# Patient Record
Sex: MALE | Race: WHITE
[De-identification: names, ages, dates, MRNs, and addresses within clinical notes are randomized per-mention and may not be internally consistent; named-entity substitution may affect disease eponyms.]

---

## 2017-08-24 ENCOUNTER — HOSPITAL ENCOUNTER (INPATIENT)
Dept: HOSPITAL 54 - ER | Age: 57
LOS: 3 days | Discharge: HOME | DRG: 177 | End: 2017-08-27
Payer: MEDICARE

## 2017-08-24 VITALS — HEIGHT: 67 IN | BODY MASS INDEX: 20.92 KG/M2 | WEIGHT: 133.31 LBS

## 2017-08-24 VITALS — DIASTOLIC BLOOD PRESSURE: 89 MMHG | SYSTOLIC BLOOD PRESSURE: 134 MMHG

## 2017-08-24 DIAGNOSIS — J15.9: ICD-10-CM

## 2017-08-24 DIAGNOSIS — J90: ICD-10-CM

## 2017-08-24 DIAGNOSIS — Z99.2: ICD-10-CM

## 2017-08-24 DIAGNOSIS — E87.5: ICD-10-CM

## 2017-08-24 DIAGNOSIS — N18.6: ICD-10-CM

## 2017-08-24 DIAGNOSIS — Z98.890: ICD-10-CM

## 2017-08-24 DIAGNOSIS — F17.200: ICD-10-CM

## 2017-08-24 DIAGNOSIS — I12.0: ICD-10-CM

## 2017-08-24 DIAGNOSIS — E11.22: ICD-10-CM

## 2017-08-24 DIAGNOSIS — J15.6: Primary | ICD-10-CM

## 2017-08-24 DIAGNOSIS — K74.60: ICD-10-CM

## 2017-08-24 DIAGNOSIS — Z87.01: ICD-10-CM

## 2017-08-24 DIAGNOSIS — E78.5: ICD-10-CM

## 2017-08-24 DIAGNOSIS — Z94.4: ICD-10-CM

## 2017-08-24 DIAGNOSIS — D89.9: ICD-10-CM

## 2017-08-24 LAB
ALBUMIN SERPL BCP-MCNC: 4.1 G/DL (ref 3.4–5)
ALP SERPL-CCNC: 211 U/L (ref 46–116)
ALT SERPL W P-5'-P-CCNC: 25 U/L (ref 12–78)
AST SERPL W P-5'-P-CCNC: 17 U/L (ref 15–37)
BASOPHILS # BLD AUTO: 0 /CMM (ref 0–0.2)
BASOPHILS NFR BLD AUTO: 0.3 % (ref 0–2)
BILIRUB DIRECT SERPL-MCNC: 0.3 MG/DL (ref 0–0.2)
BILIRUB SERPL-MCNC: 0.9 MG/DL (ref 0.2–1)
BUN SERPL-MCNC: 68 MG/DL (ref 7–18)
CALCIUM SERPL-MCNC: 8.8 MG/DL (ref 8.5–10.1)
CHLORIDE SERPL-SCNC: 100 MMOL/L (ref 98–107)
CO2 SERPL-SCNC: 24 MMOL/L (ref 21–32)
CREAT SERPL-MCNC: 9 MG/DL (ref 0.6–1.3)
EOSINOPHIL # BLD AUTO: 0.3 /CMM (ref 0–0.7)
EOSINOPHIL NFR BLD AUTO: 1.8 % (ref 0–6)
GLUCOSE SERPL-MCNC: 135 MG/DL (ref 74–106)
HCT VFR BLD AUTO: 39 % (ref 39–51)
HGB BLD-MCNC: 12.7 G/DL (ref 13.5–17.5)
LYMPHOCYTES NFR BLD AUTO: 0.9 /CMM (ref 0.8–4.8)
LYMPHOCYTES NFR BLD AUTO: 6.3 % (ref 20–44)
MCH RBC QN AUTO: 29 PG (ref 26–33)
MCHC RBC AUTO-ENTMCNC: 33 G/DL (ref 31–36)
MCV RBC AUTO: 88 FL (ref 80–96)
MONOCYTES NFR BLD AUTO: 0.9 /CMM (ref 0.1–1.3)
MONOCYTES NFR BLD AUTO: 6.1 % (ref 2–12)
NEUTROPHILS # BLD AUTO: 12 /CMM (ref 1.8–8.9)
NEUTROPHILS NFR BLD AUTO: 85.5 % (ref 43–81)
PLATELET # BLD AUTO: 192 /CMM (ref 150–450)
POTASSIUM SERPL-SCNC: 5.9 MMOL/L (ref 3.5–5.1)
PROT SERPL-MCNC: 8.9 G/DL (ref 6.4–8.2)
RBC # BLD AUTO: 4.44 MIL/UL (ref 4.5–6)
RDW COEFFICIENT OF VARIATION: 13.6 (ref 11.5–15)
SODIUM SERPL-SCNC: 138 MMOL/L (ref 136–145)
TROPONIN I SERPL-MCNC: 0.04 NG/ML (ref 0–0.06)
WBC NRBC COR # BLD AUTO: 14.1 K/UL (ref 4.3–11)

## 2017-08-24 PROCEDURE — A6402 STERILE GAUZE <= 16 SQ IN: HCPCS

## 2017-08-24 PROCEDURE — A4606 OXYGEN PROBE USED W OXIMETER: HCPCS

## 2017-08-24 PROCEDURE — Z7610: HCPCS

## 2017-08-24 NOTE — NUR
TELE RN ADMITTING NOTES

PATIENT ARRIVED ON UNIT VIA GURNEY TO -1 UNDER DR WOLF. REPORT RECEIVED FROM LENCHO JALLOH RN. PATIENT A/A/O X3, ABLE TO MAKE TO MAKE NEEDS KNOWN. BREATHING EVEN AND UNLABORED, ON 
ROOM AIR. DENIES SOB OR DIFFICULTY BREATHING. ON TELE W/ SINUS RHYTHM IN THE 90S. DENIES 
CHEST PAIN. RIGHT FORE ARM IV #20 INTACT AND PATENT, SALINE LOCK. NO COMPLICATIONS NOTED. 
SKIN WARM TO TOUCH, AFEBRILE. MILD NON-PITTING EDEMA NOTED ON BILATERAL FEET. DENIES ANY 
PAIN OR DISCOMFORT @ THIS TIME. SAFETY MEASURES IN PLACE W/ SIDE RAILS UP, BED LOCKED AND IN 
LOWEST POSITION, CALL LIGHT WITHIN REACH. WILL CONTINUE TO MONITOR.

## 2017-08-24 NOTE — NUR
[facility]

## 2017-08-24 NOTE — NUR
PATIENT PRESENT TO ER C/O COUGH, CONGESTION, AND FLU LIKE SYMPTOMS X 2 DAYS.  
PATIENT IS A/OX 4. BREATHING EVEN AND UNLABORED.  NO DISTRESS. VITALS STABLE.  
SAFETY AND COMFORT MEASURES IN  PLACE. AWAITING MD ORDERS.

## 2017-08-25 VITALS — DIASTOLIC BLOOD PRESSURE: 89 MMHG | SYSTOLIC BLOOD PRESSURE: 134 MMHG

## 2017-08-25 VITALS — SYSTOLIC BLOOD PRESSURE: 134 MMHG | DIASTOLIC BLOOD PRESSURE: 89 MMHG

## 2017-08-25 VITALS — DIASTOLIC BLOOD PRESSURE: 70 MMHG | SYSTOLIC BLOOD PRESSURE: 117 MMHG

## 2017-08-25 VITALS — SYSTOLIC BLOOD PRESSURE: 149 MMHG | DIASTOLIC BLOOD PRESSURE: 81 MMHG

## 2017-08-25 VITALS — SYSTOLIC BLOOD PRESSURE: 148 MMHG | DIASTOLIC BLOOD PRESSURE: 69 MMHG

## 2017-08-25 VITALS — SYSTOLIC BLOOD PRESSURE: 157 MMHG | DIASTOLIC BLOOD PRESSURE: 76 MMHG

## 2017-08-25 VITALS — DIASTOLIC BLOOD PRESSURE: 84 MMHG | SYSTOLIC BLOOD PRESSURE: 148 MMHG

## 2017-08-25 VITALS — SYSTOLIC BLOOD PRESSURE: 117 MMHG | DIASTOLIC BLOOD PRESSURE: 70 MMHG

## 2017-08-25 PROCEDURE — 5A1D00Z: ICD-10-PCS | Performed by: INTERNAL MEDICINE

## 2017-08-25 RX ADMIN — PIPERACILLIN SODIUM AND TAZOBACTAM SODIUM SCH MLS/HR: .25; 2 INJECTION, POWDER, LYOPHILIZED, FOR SOLUTION INTRAVENOUS at 17:12

## 2017-08-25 RX ADMIN — PIPERACILLIN SODIUM AND TAZOBACTAM SODIUM SCH MLS/HR: .25; 2 INJECTION, POWDER, LYOPHILIZED, FOR SOLUTION INTRAVENOUS at 21:19

## 2017-08-25 RX ADMIN — PANTOPRAZOLE SODIUM SCH MG: 40 TABLET, DELAYED RELEASE ORAL at 12:25

## 2017-08-25 RX ADMIN — TACROLIMUS SCH MG: 0.5 CAPSULE, GELATIN COATED ORAL at 17:12

## 2017-08-25 RX ADMIN — AMLODIPINE BESYLATE SCH MG: 2.5 TABLET ORAL at 21:17

## 2017-08-25 RX ADMIN — ZOLPIDEM TARTRATE PRN MG: 5 TABLET, FILM COATED ORAL at 22:29

## 2017-08-25 RX ADMIN — THERA TABS SCH UDTAB: TAB at 12:24

## 2017-08-25 RX ADMIN — VALSARTAN SCH MG: 80 TABLET ORAL at 17:11

## 2017-08-25 RX ADMIN — TACROLIMUS SCH MG: 0.5 CAPSULE, GELATIN COATED ORAL at 12:25

## 2017-08-25 NOTE — NUR
TELE RN CLOSING NOTES

PATIENT RESTING COMFORTABLY IN BED. NO ACUTE RESPIRATORY DISTRESS THROUGHOUT SHIFT, REMAINED 
ON ROOM AIR. DENIED ANY PAIN OR DISCOMFORT. NO SIGNIFICANT CHANGES DURING SHIFT. AMBULATED 
W/ STEADY GAIT. ALL DUE MEDS GIVEN W/ ONE TIME DOSE VANCO IV GIVEN. SAFETY MEASURES IN 
PLACE. WILL ENDORSE DESIRAE TO AM RN.

## 2017-08-25 NOTE — NUR
ms rn notes 

spoke to dr garcia re patient request for ambien,with order Ambien 5mg  via [facility] for 
insomnia, order noted and carried out.

## 2017-08-25 NOTE — NUR
TELE RN NOTE:

NO ACUTE CHANGES DURING SHIFT. PT A&OX3, DENIED PAIN, ON RA WITH RESPIRATIONS EVEN AND 
UNLABORED WITH NO SOB NOTED. PORFIRIO AV SHUNT. RFA #20 PATENT AND INTACT. BED LOW, LOCKED WITH 
CALL LIGHT WITHIN REACH. ORDERS CARRIED OUT. WILL ENDORSE TO NIGHT SHIFT NURSE FOR DESIRAE.

## 2017-08-25 NOTE — NUR
TELE RN NOTE:

RECEIVED PT RESTING COMFORTABLY IN BED WITH NO SIGN OF DISTRESS. PT IS ON RA, RESPIRATIONS 
EVEN AND UNLABORED. EASILY AWAKENED TO NAME, A&OX3, DENIES PAIN. ON TELE WITH SR 90S. PORFIRIO AV 
SHUNT. RFA SL #20 PATENT AND INTACT. MILD NON-PITTING EDEMA NOTED ON B/L FEET. BED LOW, 
LOCKED WITH CALL LIGHT WITHIN REACH. WILL CONT TO MONITOR.

## 2017-08-25 NOTE — NUR
ms rn notes 

pts on bed awake alert and responsive ,ambulatory , had  hd  today , 2.5 liters out .no sob 
no distress noted  v/s stable afebrile all needs attended too call light within reach  , all 
due meds given as ordered  kept pts clean dry and comfortable . pts is for thoracentisis   
us  guided jasmina,  consented for procedure.npo post mn  instructed , pts  made aware of  
procedure jasmina.

## 2017-08-25 NOTE — NUR
TELE RN NOTES

CALLED DR CASAREZ AND LEFT MESSAGE REGARDING NO CALL BACK FROM DR WOLF. AWAITING CALL BACK.

## 2017-08-26 VITALS — SYSTOLIC BLOOD PRESSURE: 155 MMHG | DIASTOLIC BLOOD PRESSURE: 83 MMHG

## 2017-08-26 VITALS — DIASTOLIC BLOOD PRESSURE: 73 MMHG | SYSTOLIC BLOOD PRESSURE: 122 MMHG

## 2017-08-26 VITALS — SYSTOLIC BLOOD PRESSURE: 146 MMHG | DIASTOLIC BLOOD PRESSURE: 72 MMHG

## 2017-08-26 LAB
APTT PPP: 32 SEC (ref 23–34)
INR PPP: 1.17 (ref 0.87–1.13)
PROTHROMBIN TIME: 12.7 SECS (ref 9.5–12.7)

## 2017-08-26 PROCEDURE — 0W993ZZ DRAINAGE OF RIGHT PLEURAL CAVITY, PERCUTANEOUS APPROACH: ICD-10-PCS

## 2017-08-26 RX ADMIN — PANTOPRAZOLE SODIUM SCH MG: 40 TABLET, DELAYED RELEASE ORAL at 11:10

## 2017-08-26 RX ADMIN — VALSARTAN SCH MG: 80 TABLET ORAL at 11:11

## 2017-08-26 RX ADMIN — PIPERACILLIN SODIUM AND TAZOBACTAM SODIUM SCH MLS/HR: .25; 2 INJECTION, POWDER, LYOPHILIZED, FOR SOLUTION INTRAVENOUS at 13:40

## 2017-08-26 RX ADMIN — TACROLIMUS SCH MG: 0.5 CAPSULE, GELATIN COATED ORAL at 11:11

## 2017-08-26 RX ADMIN — VALSARTAN SCH MG: 80 TABLET ORAL at 17:18

## 2017-08-26 RX ADMIN — AMLODIPINE BESYLATE SCH MG: 2.5 TABLET ORAL at 22:39

## 2017-08-26 RX ADMIN — THERA TABS SCH UDTAB: TAB at 11:10

## 2017-08-26 RX ADMIN — PIPERACILLIN SODIUM AND TAZOBACTAM SODIUM SCH MLS/HR: .25; 2 INJECTION, POWDER, LYOPHILIZED, FOR SOLUTION INTRAVENOUS at 21:27

## 2017-08-26 RX ADMIN — ZOLPIDEM TARTRATE PRN MG: 5 TABLET, FILM COATED ORAL at 22:39

## 2017-08-26 RX ADMIN — PIPERACILLIN SODIUM AND TAZOBACTAM SODIUM SCH MLS/HR: .25; 2 INJECTION, POWDER, LYOPHILIZED, FOR SOLUTION INTRAVENOUS at 04:43

## 2017-08-26 RX ADMIN — TACROLIMUS SCH MG: 0.5 CAPSULE, GELATIN COATED ORAL at 17:17

## 2017-08-26 NOTE — NUR
RN INITIAL NOTE



RN INITIAL NOTE



RECEIVED PT IN NO ACUTE DISTRESS IN BED. PT IS A/O X 3 AND ABLE TO MAKE NEEDS KNOWN. PT IS 
NOT C/O ANY SOB, DIFFICULTY BREATHING OR PAIN AT THIS TIME. PT HAS RFA 20G THAT IS CLEAN DRY 
INTACT AND PATENT WITH SALINE FLUSH. LINE IS SALINE LOCKED. PT HAS PORFIRIO AV SHUNT THAT IS 
CLEAN AND DRY.  BED IN LOW LOCK POSITION WITH RIALS UP X 2. CALL LIGHT WITHIN REACH AND ALL 
SAFETY MEASURES ENSURED AND CARRIED OUT. WILL CONTINUE TO MONITOR PT. PT IS REQUESTING TO BE 
D/C, BUT DOESNT WANT TO GO AMA. WILL CONTACT MD FOR ORDERS.

## 2017-08-26 NOTE — NUR
RN NOTE



SPOKE TO DR ALVARES ABOUT PT REQUESTING TO BE D/C. MD STATED HE WILL BE HERE IN THE 
MORNING TO ASSESS PT AND PUT IN DISCHARGE ORDERS. PT NOTIFIED.

## 2017-08-26 NOTE — NUR
TELE RN NOTE:

RECEIVED PT AWAKE IN BED, A&OX3, DENIES PAIN. ON RA, RESPIRATIONS EVEN AND UNLABORED. PORFIRIO AV 
SHUNT NOTED. RFA #20 PATENT AND INTACT. ALL NEEDS MET AND ANTICIPATED. BED LOW, LOCKED WITH 
CALL LIGHT WITHIN REACH. WILL CONT TO MONITOR.

## 2017-08-26 NOTE — NUR
TELE RN NOTE:

NO ACUTE CHANGES DURING SHIFT. PT A&OX3, DENIED PAIN, ON RA WITH RESPIRATIONS EVEN AND 
UNLABORED WITH NO SOB NOTED. PORFIRIO AV SHUNT. RFA #20 PATENT AND INTACT. BED LOW, LOCKED WITH 
CALL LIGHT WITHIN REACH. ORDERS CARRIED OUT. WILL ENDORSE TO NIGHT SHIFT NURSE FOR DESIARE.

## 2017-08-26 NOTE — NUR
MS RN NOTE:

PT POST THORACENTESIS FLUID REMOVED 1500ML. STAT XR 1 VIEW CHEST ORDERED. WILL HAVE TO 
REORDER XR 1 VIEW CHEST IN 1 HR PER RADIOLOGIST DR. GOODE.

## 2017-08-27 VITALS — DIASTOLIC BLOOD PRESSURE: 78 MMHG | SYSTOLIC BLOOD PRESSURE: 148 MMHG

## 2017-08-27 VITALS — DIASTOLIC BLOOD PRESSURE: 87 MMHG | SYSTOLIC BLOOD PRESSURE: 148 MMHG

## 2017-08-27 VITALS — SYSTOLIC BLOOD PRESSURE: 148 MMHG | DIASTOLIC BLOOD PRESSURE: 85 MMHG

## 2017-08-27 LAB
BUN SERPL-MCNC: 59 MG/DL (ref 7–18)
CALCIUM SERPL-MCNC: 8.1 MG/DL (ref 8.5–10.1)
CHLORIDE SERPL-SCNC: 97 MMOL/L (ref 98–107)
CO2 SERPL-SCNC: 28 MMOL/L (ref 21–32)
CREAT SERPL-MCNC: 9.8 MG/DL (ref 0.6–1.3)
GLUCOSE SERPL-MCNC: 155 MG/DL (ref 74–106)
POTASSIUM SERPL-SCNC: 3.6 MMOL/L (ref 3.5–5.1)
SODIUM SERPL-SCNC: 135 MMOL/L (ref 136–145)

## 2017-08-27 RX ADMIN — VALSARTAN SCH MG: 80 TABLET ORAL at 08:06

## 2017-08-27 RX ADMIN — PANTOPRAZOLE SODIUM SCH MG: 40 TABLET, DELAYED RELEASE ORAL at 08:06

## 2017-08-27 RX ADMIN — PIPERACILLIN SODIUM AND TAZOBACTAM SODIUM SCH MLS/HR: .25; 2 INJECTION, POWDER, LYOPHILIZED, FOR SOLUTION INTRAVENOUS at 05:10

## 2017-08-27 RX ADMIN — TACROLIMUS SCH MG: 0.5 CAPSULE, GELATIN COATED ORAL at 08:06

## 2017-08-27 RX ADMIN — THERA TABS SCH UDTAB: TAB at 08:05

## 2017-08-27 NOTE — NUR
MS RN NOTE

 DISCHARGE INSTRUCTION GIVEN ,UNDERSTOOD ,INSTRUCTED  HOW TO TAKE NEW PX AND POSSIBLE SIDE 
EFFECT ALSO INSTRUCTED TO FOLLOW WITH PRIMARY CARE DOCTOR AND HD SCHEDULE , OK TO GO HOME  
SELF , ABLE TO DRIVE CAR , HL REMOVED, DRY DRESSING APPLIED , BELONGING SIGNED ,WENT HOME 
WITH STABLE CONDITION

## 2017-08-27 NOTE — NUR
MS RN NOTE 





RECEIVED PT IN NO ACUTE DISTRESS IN BED. PT IS A/O X 3 AND ABLE TO MAKE NEEDS KNOWN. PT IS 
NOT C/O ANY SOB, DIFFICULTY BREATHING OR PAIN AT THIS TIME. PT HAS RFA 20G THAT IS CLEAN DRY 
INTACT AND PATENT WITH SALINE FLUSH. LINE IS SALINE LOCKED. PT HAS PORFIRIO AV SHUNT THAT IS 
CLEAN AND DRY.  BED IN LOW LOCK POSITION WITH RIALS UP X 2. CALL LIGHT WITHIN REACH AND ALL 
SAFETY MEASURES ENSURED AND CARRIED OUT. WILL CONTINUE TO MONITOR PT CLOSELY,PLANOF CARE 
DISCUSSED WITH PATIENT

## 2017-08-27 NOTE — NUR
RN CLOSING NOTE



PT REMAINS IN NO ACUTE DISTRESS IN BED. PT DID NOT HAVE ANY SIGNIFICANT CHANGE IN CONDITION 
DURING SHIFT. ALL NEEDS MET ALL ORDERS CARRIED OUT. WILL ENDORSE TO AM RN FOR CONTINUITY OF 
CARE. AWAITING ARRIVAL OF DR ALVARES.

## 2018-01-11 ENCOUNTER — HOSPITAL ENCOUNTER (INPATIENT)
Dept: HOSPITAL 54 - ER | Age: 58
LOS: 4 days | Discharge: LEFT BEFORE DISCHARGE | DRG: 291 | End: 2018-01-15
Attending: INTERNAL MEDICINE | Admitting: INTERNAL MEDICINE
Payer: MEDICARE

## 2018-01-11 VITALS — BODY MASS INDEX: 24.96 KG/M2 | HEIGHT: 67 IN | WEIGHT: 159 LBS

## 2018-01-11 DIAGNOSIS — I42.9: ICD-10-CM

## 2018-01-11 DIAGNOSIS — Z87.01: ICD-10-CM

## 2018-01-11 DIAGNOSIS — R18.8: ICD-10-CM

## 2018-01-11 DIAGNOSIS — N18.6: ICD-10-CM

## 2018-01-11 DIAGNOSIS — R93.5: ICD-10-CM

## 2018-01-11 DIAGNOSIS — Z87.891: ICD-10-CM

## 2018-01-11 DIAGNOSIS — Z99.2: ICD-10-CM

## 2018-01-11 DIAGNOSIS — E87.5: ICD-10-CM

## 2018-01-11 DIAGNOSIS — Z94.4: ICD-10-CM

## 2018-01-11 DIAGNOSIS — I13.2: Primary | ICD-10-CM

## 2018-01-11 DIAGNOSIS — J96.00: ICD-10-CM

## 2018-01-11 DIAGNOSIS — I27.20: ICD-10-CM

## 2018-01-11 DIAGNOSIS — J90: ICD-10-CM

## 2018-01-11 DIAGNOSIS — I50.33: ICD-10-CM

## 2018-01-11 DIAGNOSIS — I31.3: ICD-10-CM

## 2018-01-11 PROCEDURE — Z7610: HCPCS

## 2018-01-11 PROCEDURE — A4606 OXYGEN PROBE USED W OXIMETER: HCPCS

## 2018-01-11 NOTE — NUR
PT PRESENTED TO THE ER WITH A C/O SOB. PT STATED THAT HE GETS SOB WHEN LYING 
DOWN. PT IS AFRAID THAT HE MAY HAVE FLUID ON HIS LUNGS. PT AMBULATED TO BED #6 
WITH A STEADY GAIT. PT IS SPEAKING IN FULL SENTENCES. PT IS ON THE MONITOR AND 
CONTINUOUS PULSE OX.

## 2018-01-12 VITALS — DIASTOLIC BLOOD PRESSURE: 94 MMHG | SYSTOLIC BLOOD PRESSURE: 152 MMHG

## 2018-01-12 VITALS — DIASTOLIC BLOOD PRESSURE: 84 MMHG | SYSTOLIC BLOOD PRESSURE: 142 MMHG

## 2018-01-12 VITALS — DIASTOLIC BLOOD PRESSURE: 75 MMHG | SYSTOLIC BLOOD PRESSURE: 140 MMHG

## 2018-01-12 VITALS — DIASTOLIC BLOOD PRESSURE: 84 MMHG | SYSTOLIC BLOOD PRESSURE: 122 MMHG

## 2018-01-12 VITALS — SYSTOLIC BLOOD PRESSURE: 152 MMHG | DIASTOLIC BLOOD PRESSURE: 94 MMHG

## 2018-01-12 VITALS — DIASTOLIC BLOOD PRESSURE: 90 MMHG | SYSTOLIC BLOOD PRESSURE: 150 MMHG

## 2018-01-12 VITALS — DIASTOLIC BLOOD PRESSURE: 76 MMHG | SYSTOLIC BLOOD PRESSURE: 117 MMHG

## 2018-01-12 LAB
ALBUMIN SERPL BCP-MCNC: 3.6 G/DL (ref 3.4–5)
ALP SERPL-CCNC: 238 U/L (ref 46–116)
ALT SERPL W P-5'-P-CCNC: 15 U/L (ref 12–78)
APTT PPP: 32 SEC (ref 23–34)
AST SERPL W P-5'-P-CCNC: 6 U/L (ref 15–37)
BASOPHILS # BLD AUTO: 0 /CMM (ref 0–0.2)
BASOPHILS NFR BLD AUTO: 0.4 % (ref 0–2)
BILIRUB DIRECT SERPL-MCNC: 0.3 MG/DL (ref 0–0.2)
BILIRUB SERPL-MCNC: 0.8 MG/DL (ref 0.2–1)
BUN SERPL-MCNC: 53 MG/DL (ref 7–18)
CALCIUM SERPL-MCNC: 9.3 MG/DL (ref 8.5–10.1)
CHLORIDE SERPL-SCNC: 99 MMOL/L (ref 98–107)
CO2 SERPL-SCNC: 31 MMOL/L (ref 21–32)
CREAT SERPL-MCNC: 8.2 MG/DL (ref 0.6–1.3)
EOSINOPHIL # BLD AUTO: 0.2 /CMM (ref 0–0.7)
EOSINOPHIL NFR BLD AUTO: 2.5 % (ref 0–6)
GLUCOSE SERPL-MCNC: 132 MG/DL (ref 74–106)
HCT VFR BLD AUTO: 37 % (ref 39–51)
HGB BLD-MCNC: 12.2 G/DL (ref 13.5–17.5)
INR PPP: 1.08 (ref 0.87–1.13)
LYMPHOCYTES NFR BLD AUTO: 1.1 /CMM (ref 0.8–4.8)
LYMPHOCYTES NFR BLD AUTO: 12.1 % (ref 20–44)
MCH RBC QN AUTO: 29 PG (ref 26–33)
MCHC RBC AUTO-ENTMCNC: 33 G/DL (ref 31–36)
MCV RBC AUTO: 86 FL (ref 80–96)
MONOCYTES NFR BLD AUTO: 0.8 /CMM (ref 0.1–1.3)
MONOCYTES NFR BLD AUTO: 8.3 % (ref 2–12)
NEUTROPHILS # BLD AUTO: 7 /CMM (ref 1.8–8.9)
NEUTROPHILS NFR BLD AUTO: 76.7 % (ref 43–81)
NT-PROBNP SERPL-MCNC: (no result) PG/ML (ref 0–125)
PLATELET # BLD AUTO: 257 /CMM (ref 150–450)
POTASSIUM SERPL-SCNC: 5.3 MMOL/L (ref 3.5–5.1)
PROT SERPL-MCNC: 9.1 G/DL (ref 6.4–8.2)
RBC # BLD AUTO: 4.29 MIL/UL (ref 4.5–6)
RDW COEFFICIENT OF VARIATION: 16.2 (ref 11.5–15)
SODIUM SERPL-SCNC: 139 MMOL/L (ref 136–145)
TROPONIN I SERPL-MCNC: < 0.017 NG/ML (ref 0–0.06)
WBC NRBC COR # BLD AUTO: 9.1 K/UL (ref 4.3–11)

## 2018-01-12 PROCEDURE — 5A1D70Z PERFORMANCE OF URINARY FILTRATION, INTERMITTENT, LESS THAN 6 HOURS PER DAY: ICD-10-PCS | Performed by: INTERNAL MEDICINE

## 2018-01-12 PROCEDURE — 0W9B3ZZ DRAINAGE OF LEFT PLEURAL CAVITY, PERCUTANEOUS APPROACH: ICD-10-PCS

## 2018-01-12 RX ADMIN — TACROLIMUS SCH MG: 0.5 CAPSULE, GELATIN COATED ORAL at 11:49

## 2018-01-12 RX ADMIN — VALSARTAN SCH MG: 80 TABLET ORAL at 09:32

## 2018-01-12 RX ADMIN — TACROLIMUS SCH MG: 0.5 CAPSULE, GELATIN COATED ORAL at 17:09

## 2018-01-12 RX ADMIN — THERA TABS SCH UDTAB: TAB at 09:32

## 2018-01-12 RX ADMIN — PANTOPRAZOLE SODIUM SCH MG: 40 TABLET, DELAYED RELEASE ORAL at 09:31

## 2018-01-12 RX ADMIN — VALSARTAN SCH MG: 80 TABLET ORAL at 17:09

## 2018-01-12 RX ADMIN — AMLODIPINE BESYLATE SCH MG: 2.5 TABLET ORAL at 21:15

## 2018-01-12 NOTE — NUR
RN NOTES

PT S/P THORACENTESIS WITH NO APPARENT PAIN OR DISTRESS AT THIS TIME, RESPIRATIONS EVEN AND 
UNLABORED, WITH ORDERS BY DR COPPOLA FOR STAT CXR WILL CONTINUE TO MONITOR, BODY FLUIDS TO BE 
SENT TO LAB

## 2018-01-12 NOTE — NUR
MS RN OPENING NOTES: 



PATIENT IN BED, AOX4, ON ROOM AIR, BREATHING EVEN AND UNLABORED, APPEARS CALM AND IN NO 
DISTRESS. DENIES PAIN AT THIS TIME.   PORFIRIO AV FISTULA WITH POSITIVE THRILLS AND BRUITS. PIV 
OVER RFA G 20 INTACT AND PATENT TO FLUSH. HOWEVER,  PATIENT KEEPS REQUESTING TO HAVE IT 
REMOVED SINCE , PER HIM, IT HAS NOT BEEN USED FOR 36 HRS AND THAT HE IS AFRAID OF CATCHING 
AN INFECTION. EXPLAINED RISKS AND POLICY TO PATIENT, BUT HE STILL INSISTS ON TAKING IT OFF.  
PROVIDED FOR COMFORT AND SAFETY. BED IN LOWEST AND LOCKED POSITION, SIDERAILS UP X 2. WILL 
CONT TO MONITOR.

## 2018-01-12 NOTE — NUR
RN CLOSING NOTES



PATIENT IN BED RESTING. NO SOB. NO ACUTE DISTRESS. RESPIRATION EVEN AND UNLABORED. NO S/S OF 
PAIN OR DISCOMFORT AT THIS TIME. ALL NEEDS ATTENDED AND PROVIDED. BED IN LOCKED, LOW 
POSITION, CALL LIGHT WITHIN  REACH. ENDORSED TO NEXT SHIFT NURSE FOR DESIRAE.

## 2018-01-12 NOTE — NUR
1000  TALKED TO WILLIAM GOMEZ REGARDING CONSENT. SHE SAID SHE WILL GIVE ME A CALL BACK ASAP WHEN IT 
IS SIGNED

## 2018-01-12 NOTE — NUR
RN NOTES

RECEIVED PATIENT RESTING COMFORTABLY IN BED, AWAKE ALERT AND VERBALLY RESPONSIVE, ABLE TO 
MAKE NEEDS KNOWN. RESPIRATIONS EVEN AND UNLABORED. NO FACIAL GRIMACING OR GUARDED MOVEMENT 
NOTED, DENIES ANY PAIN OR DISCOMFORT. IV ACCESS TO RFA NOTED PATENT AND INTACT NO REDNESS OR 
INFILTRATION NOTED. PT HAVING DIALYSIS TREATMENT AT THIS  TIME, SAFETY MEASURES IN PLACE, 
WILL CONTINUE TO MONITOR.

## 2018-01-12 NOTE — NUR
RN NOTES

PATIENT RESTING COMFORTABLY IN BED, AWAKE ALERT AND VERBALLY RESPONSIVE, ABLE TO MAKE NEEDS 
KNOWN. RESPIRATIONS EVEN AND UNLABORED. NO FACIAL GRIMACING OR GUARDED MOVEMENT NOTED, 
DENIES ANY PAIN OR DISCOMFORT. IV ACCESS TO RFA NOTED PATENT AND INTACT NO REDNESS OR 
INFILTRATION NOTED. PT S/P DIALYSIS TREATMENT AND THORACENTESIS AT THIS  TIME, IN NO 
APPARENT DISTRESS, SAFETY MEASURES IN PLACE, WILL CONTINUE TO MONITOR AND ENDORSE TO NEXT 
SHIFT FOR CONTINUITY OF CARE.

## 2018-01-12 NOTE — NUR
RN ADMISSION 



PT. WAS BROUGHT BY EMERGENCY DEPARTMENT IN A GURNEY. PT. A&OX3-4. BREATHING UNLABORED ON 
ROOM AIR. NO SOB. NO COMPLAIN OF PAIN/DISCOMFORT AT THIS TIME. RIGHT FOREARM IV #20 PATENT 
AND INTACT. NO REDNESS/INFILTRATION NOTED. ALL NEEDS ATTENDED AND ANTICIPATED. BED IN LOW 
AND LOCKED POSITION. SIDERAILS UPX2. CALL LIGHT WITHIN REACH. WILL CONTINUE TO MONITOR.

## 2018-01-13 VITALS — SYSTOLIC BLOOD PRESSURE: 132 MMHG | DIASTOLIC BLOOD PRESSURE: 92 MMHG

## 2018-01-13 VITALS — DIASTOLIC BLOOD PRESSURE: 74 MMHG | SYSTOLIC BLOOD PRESSURE: 134 MMHG

## 2018-01-13 VITALS — SYSTOLIC BLOOD PRESSURE: 134 MMHG | DIASTOLIC BLOOD PRESSURE: 74 MMHG

## 2018-01-13 VITALS — DIASTOLIC BLOOD PRESSURE: 88 MMHG | SYSTOLIC BLOOD PRESSURE: 153 MMHG

## 2018-01-13 VITALS — DIASTOLIC BLOOD PRESSURE: 80 MMHG | SYSTOLIC BLOOD PRESSURE: 130 MMHG

## 2018-01-13 LAB
ALBUMIN SERPL BCP-MCNC: 3.3 G/DL (ref 3.4–5)
ALP SERPL-CCNC: 219 U/L (ref 46–116)
ALT SERPL W P-5'-P-CCNC: 11 U/L (ref 12–78)
AST SERPL W P-5'-P-CCNC: 5 U/L (ref 15–37)
BASOPHILS # BLD AUTO: 0 /CMM (ref 0–0.2)
BASOPHILS NFR BLD AUTO: 0.7 % (ref 0–2)
BILIRUB SERPL-MCNC: 0.8 MG/DL (ref 0.2–1)
BUN SERPL-MCNC: 40 MG/DL (ref 7–18)
CALCIUM SERPL-MCNC: 9 MG/DL (ref 8.5–10.1)
CHLORIDE SERPL-SCNC: 97 MMOL/L (ref 98–107)
CO2 SERPL-SCNC: 32 MMOL/L (ref 21–32)
CREAT SERPL-MCNC: 7.1 MG/DL (ref 0.6–1.3)
EOSINOPHIL # BLD AUTO: 0.3 /CMM (ref 0–0.7)
EOSINOPHIL NFR BLD AUTO: 3.7 % (ref 0–6)
GLUCOSE SERPL-MCNC: 128 MG/DL (ref 74–106)
HCT VFR BLD AUTO: 34 % (ref 39–51)
HGB BLD-MCNC: 12 G/DL (ref 13.5–17.5)
LYMPHOCYTES NFR BLD AUTO: 1.3 /CMM (ref 0.8–4.8)
LYMPHOCYTES NFR BLD AUTO: 17.1 % (ref 20–44)
MAGNESIUM SERPL-MCNC: 2.1 MG/DL (ref 1.8–2.4)
MCH RBC QN AUTO: 34 PG (ref 26–33)
MCHC RBC AUTO-ENTMCNC: 35 G/DL (ref 31–36)
MCV RBC AUTO: 95 FL (ref 80–96)
MONOCYTES NFR BLD AUTO: 0.8 /CMM (ref 0.1–1.3)
MONOCYTES NFR BLD AUTO: 11.4 % (ref 2–12)
NEUTROPHILS # BLD AUTO: 4.9 /CMM (ref 1.8–8.9)
NEUTROPHILS NFR BLD AUTO: 67.1 % (ref 43–81)
PHOSPHATE SERPL-MCNC: 4.3 MG/DL (ref 2.5–4.9)
PLATELET # BLD AUTO: 268 /CMM (ref 150–450)
POTASSIUM SERPL-SCNC: 5.3 MMOL/L (ref 3.5–5.1)
PROT SERPL-MCNC: 8.3 G/DL (ref 6.4–8.2)
RBC # BLD AUTO: 3.56 MIL/UL (ref 4.5–6)
RDW COEFFICIENT OF VARIATION: 16.1 (ref 11.5–15)
SODIUM SERPL-SCNC: 139 MMOL/L (ref 136–145)
TROPONIN I SERPL-MCNC: < 0.017 NG/ML (ref 0–0.06)
WBC NRBC COR # BLD AUTO: 7.3 K/UL (ref 4.3–11)

## 2018-01-13 PROCEDURE — 5A1D70Z PERFORMANCE OF URINARY FILTRATION, INTERMITTENT, LESS THAN 6 HOURS PER DAY: ICD-10-PCS | Performed by: INTERNAL MEDICINE

## 2018-01-13 RX ADMIN — PANTOPRAZOLE SODIUM SCH MG: 40 TABLET, DELAYED RELEASE ORAL at 08:47

## 2018-01-13 RX ADMIN — TACROLIMUS SCH MG: 0.5 CAPSULE, GELATIN COATED ORAL at 08:47

## 2018-01-13 RX ADMIN — AMLODIPINE BESYLATE SCH MG: 2.5 TABLET ORAL at 22:13

## 2018-01-13 RX ADMIN — VALSARTAN SCH MG: 80 TABLET ORAL at 09:00

## 2018-01-13 RX ADMIN — THERA TABS SCH UDTAB: TAB at 08:47

## 2018-01-13 RX ADMIN — TACROLIMUS SCH MG: 0.5 CAPSULE, GELATIN COATED ORAL at 17:01

## 2018-01-13 RX ADMIN — VALSARTAN SCH MG: 80 TABLET ORAL at 17:02

## 2018-01-13 NOTE — NUR
PER MD ROBINSON DAO TO PLACE ORDER FOR CT OF ABD AND PELVIS WITH CONTRAST FOR TOMORROW 1/14/18. 
CONSENT SIGNED AND PLACED IN CHART. PT TO BE NPO AFTER MIDNIGHT. PER MD ROBINSON DAO PT CAN HAVE 
CONTRAST DUE TO BEING ON DIALYSIS. NO IV SITE PER PATIENT REQUEST.

## 2018-01-13 NOTE — NUR
MS RN CLOSING NOTES:  

PATIENT IN BED, AOX4, ON ROOM AIR, BREATHING EVEN AND UNLABORED. BREATH SOUNDS CLEAR TO 
AUSCULTATION.  APPEARS CALM AND IN NO DISTRESS. DENIES PAIN AT THIS TIME.  PORFIRIO AV FISTULA 
POSITIVE FOR THRILL AND BRUITS. PATIENT HAS NO PIV AT THIS TIME, REQUESTING FOR IT TO BE 
REINSERTED AT LATER TIME.   DUE MEDS GIVEN, PROVIDED FOR COMFORT AND SAFETY. BED IN LOWEST 
AND LOCKED POSITION, SIDERAILS UP X 2. CALL LIGHT WITHIN REACH. WILL ENDORSE TO AM RN FOR 
DESIRAE.

## 2018-01-13 NOTE — NUR
MS RN OPENING NOTES

RECEIVED PT IN BED ALERT,AWAKE,VERBALLY RESPONSIVE,ON ROOM AIR,NO SOB,NO APPARENT DISTRESS 
NOTED. DENIES ANY PAIN OR DISCOMFORT AT THIS TIME. REFUSED IV SITE TO BE INSERTED, OFFERED 
X3,EXPLAINED BENEFITS,REFUSED, MD AWARE. CALL LIGHT WITHIN REACH.ATTENDED ALL NEEDS. WILL 
CONTINUE TO MONITOR ACCORDINGLY.

## 2018-01-13 NOTE — NUR
MS RN: CLOSING NOTE

PT A/OX4. TOOK ALL MEDICATIONS ON TIME. NO ADVERSE REACTIONS NOTED. NO PAIN NOTED. ON ROOM 
AIR SATING AT 96%. OGURIC. BRP. AMBULATORY. SKIN INTACT. RENAL DIET. DIALYSIS DONE 1/13/18. 
2L REMOVED. PER MD ROBINSON DAO TO PLACE ORDER FOR CT OF ABD AND PELVIS WITH CONTRAST FOR 
TOMORROW 1/14/18. CONSENT SIGNED AND PLACED IN CHART. PT TO BE NPO AFTER MIDNIGHT. PER MD ROBINSON DAO PT CAN HAVE CONTRAST DUE TO BEING ON DIALYSIS. NO IV SITE PER PATIENT REQUEST. ALL 
RISKS AND BENEFITS EXPLAINED. MD AWARE. PT REFUSED TO REINSERT IV. PORFIRIO AV FISTULA IN PLACE 
AND DRESSING INTACT. RESTING COMFORTABLY IN BED. CALL LIGHT WITHIN REACH.

## 2018-01-13 NOTE — NUR
RN NOTES: 



PATIENT REFUSED EKG TO BE TAKEN AT THIS TIME, SAYING HE WANTS TO HAVE IT DONE AT 0700 AM.

## 2018-01-13 NOTE — NUR
MS RN: INITIAL NOTE

RECEIVED PT /AOX4. MS. NO DISTRESS NOTED. NO SOB NOTED. NO PAIN NOTED. BRP. LAST HD 1/13/18. 
3L REMOVED. SKIN INTACT. AMBULATORY. ON RENAL DIET. PORFIRIO FISTULA FOR HD. NO IV ACCESS NOTED. 
PER NIGHT SHIFT PT REQUESTED TO REMOVE IV. ALL RISKS AND BENEFITS EXPLAINED. EXPLAINED TO PT 
THE NEED FOR IV. PT STILL REFUSED IN AM. RESTING COMFORTABLY IN BED. CALL LIGHT WITHIN 
REACH.

## 2018-01-13 NOTE — NUR
RN NOTES: 



PATIENT KEPT REQUESTING FOR PIV TO BE TAKEN OFF.  RISKS VS BENEFITS EXPLAINED, BUT PATIENT 
KEPT SAYING THAT HE FEARS HAVING AN INFECTION. VS STABLE, PATIENT'S BREATHING EVEN AND 
UNLABORED, DENIES PAIN AT THIS TIME. PIV OVER RFA TAKEN OUT. NO SIGNS OF   BLEEDING NOTED. 
WILL CONT TO MONITOR.

## 2018-01-14 VITALS — DIASTOLIC BLOOD PRESSURE: 79 MMHG | SYSTOLIC BLOOD PRESSURE: 156 MMHG

## 2018-01-14 VITALS — DIASTOLIC BLOOD PRESSURE: 83 MMHG | SYSTOLIC BLOOD PRESSURE: 140 MMHG

## 2018-01-14 VITALS — SYSTOLIC BLOOD PRESSURE: 148 MMHG | DIASTOLIC BLOOD PRESSURE: 82 MMHG

## 2018-01-14 VITALS — SYSTOLIC BLOOD PRESSURE: 149 MMHG | DIASTOLIC BLOOD PRESSURE: 92 MMHG

## 2018-01-14 RX ADMIN — THERA TABS SCH UDTAB: TAB at 11:19

## 2018-01-14 RX ADMIN — TACROLIMUS SCH MG: 0.5 CAPSULE, GELATIN COATED ORAL at 11:18

## 2018-01-14 RX ADMIN — VALSARTAN SCH MG: 80 TABLET ORAL at 16:23

## 2018-01-14 RX ADMIN — TACROLIMUS SCH MG: 0.5 CAPSULE, GELATIN COATED ORAL at 16:22

## 2018-01-14 RX ADMIN — AMLODIPINE BESYLATE SCH MG: 2.5 TABLET ORAL at 21:27

## 2018-01-14 RX ADMIN — VALSARTAN SCH MG: 80 TABLET ORAL at 11:19

## 2018-01-14 RX ADMIN — PANTOPRAZOLE SODIUM SCH MG: 40 TABLET, DELAYED RELEASE ORAL at 11:18

## 2018-01-14 NOTE — NUR
MS RN: INITIAL NOTE

RECEIVED PT A/OX4. ON MS. CONTINENT. AMBULATORY. NPO DUE TO SCHEDULED CT OF ABD AND PELVIS 
WITH CONTRAST PER MD DAO ORDER. PT REFUSES IV. ALL RISKS AND BENEFITS EXPLAINED. PORFIRIO AV 
FISTULA FOR HD. SITE CLEAR AND DRESSING INTACT. NO DISTRESS NOTED. NO SOB NOTED. NO PAIN 
NOTED. RESTING COMFORTABLY IN BED. CALL LIGHT WITHIN REACH.

## 2018-01-14 NOTE — NUR
MS RN CLOSING NOTES

PT IN BES, AWAKE,ALERT,VERBALLY RESPONSIVE,ON ROOM AIR, NO SOB,NO APPARENT DISTRESS 
NOTED.DENIES ANY PAIN OR DISCOMFORT AT THIS TIME. REFUSED IV ACCESS, EXPLAINED 
BENEFITS,REFUSED AT THIS TIME.MD AWARE. CALL LIGHT WITHIN REACH. WILL CONTINUE TO MONITOR 
ACCORDINGLY.

## 2018-01-14 NOTE — NUR
MS RN:CLOSING NOTE

PT TOOK ALL MEDICATIONS ON TIME. NO ADVERSE REACTIONS NOTED. NO PAIN NOTED. NO SOB NOTED. 
CONTINENT. AMBULATORY. SKIN INTACT. NO IV SITE NOTED. PT REFUSED. PLACED IV SITE FOR 
CONTRAST OF CT OF A/P. REMOVED AFTER DONE WITH PROCEDURE. ALL RISKS AND BENEFITS EXPLAINED. 
L UA AV FISTULA IN PLACE. DRESSING INTACT. STAYING DUE TO FOLLOW UP WITH DOE [place] 
GENERAL SURGEON IN THE AM. RESTING COMFORTABLY IN BED. CALL LIGHT WITHIN REACH.

## 2018-01-14 NOTE — NUR
ms rn opening notes

Received pt in bed awake,alert, verbally responsive,on room air, no sob, no apparent 
distress noted. denies any pain or discomfort at this time. no IV site. call light within 
reach. will continue to monitor accordingly.

## 2018-01-15 VITALS — DIASTOLIC BLOOD PRESSURE: 75 MMHG | SYSTOLIC BLOOD PRESSURE: 123 MMHG

## 2018-01-15 VITALS — SYSTOLIC BLOOD PRESSURE: 156 MMHG | DIASTOLIC BLOOD PRESSURE: 79 MMHG

## 2018-01-15 VITALS — SYSTOLIC BLOOD PRESSURE: 152 MMHG | DIASTOLIC BLOOD PRESSURE: 79 MMHG

## 2018-01-15 PROCEDURE — 5A1D70Z PERFORMANCE OF URINARY FILTRATION, INTERMITTENT, LESS THAN 6 HOURS PER DAY: ICD-10-PCS | Performed by: INTERNAL MEDICINE

## 2018-01-15 RX ADMIN — PANTOPRAZOLE SODIUM SCH MG: 40 TABLET, DELAYED RELEASE ORAL at 08:01

## 2018-01-15 RX ADMIN — TACROLIMUS SCH MG: 0.5 CAPSULE, GELATIN COATED ORAL at 17:39

## 2018-01-15 RX ADMIN — THERA TABS SCH UDTAB: TAB at 08:01

## 2018-01-15 RX ADMIN — VALSARTAN SCH MG: 80 TABLET ORAL at 17:39

## 2018-01-15 RX ADMIN — TACROLIMUS SCH MG: 0.5 CAPSULE, GELATIN COATED ORAL at 08:01

## 2018-01-15 RX ADMIN — VALSARTAN SCH MG: 80 TABLET ORAL at 08:01

## 2018-01-15 NOTE — NUR
321-1

PATIETN IN BED, ALERT, ORIENTED X3, ABLE TO AMBULATE TO BATHROOM WITH SUPERVIDION, SKIN WARM 
TO TOUCH, RESPIRATIONS EVEN ANSD UNLABORED, DENIES PAIN, ABLE TO SLEEP DURING THE NIGHT, NO 
IV SITE AND WAS INFORMED TO MD, WILL ENDORSE TO AM RN FOR DESIRAE.

## 2018-01-15 NOTE — NUR
RN NOTES

PATIENT CAME UP TO THE NURSING STATION, SCREAMING AT STAFF, PATIENT WANTS TO LEAVE AMA 
BECAUSE HE DOES NOT WANT TO WAIT FOR THE CONSULT OF THE GENERAL SURGEON. EXPLAINED RISKS AND 
BENEFITS STILL REFUSED. INFORMED PATIENT, DR. ALEXANDER MADE AWARE OF CONSULT BUT MD DID NOT 
GIVE AN ESTIMATED TIME WHEN HE'S COMING TONIGHT. PATIENT IS UPSET, HE'S REFUSING TO STAY 
ANOTHER NIGHT TO WAIT FOR A CONSULT. PATIENT SIGNED AMA FORM, REQUESTED EXIT CARE PAPERWORKS 
AND CD IMAGING, WHICH WAS PROVIDED. CHARGE NURSE MADE AWARE AND TRIED EXPLAINING SITUATION 
TO THE PATIENT. PATIENT REFUSED TO LISTEN TO EXPLANATION, AND HE'S RAISED HIS VOICE TO THE 
STAFF. SECURITY WAS CALLED. BELONGINGS RECONCILED AND MEDICATIONS GIVEN TO THE PATIENT.  
PATIENT LEFT AGAINST MEDICAL ADVICE ACCOMPANIED BY SECURITY. DR. ROBINSON DAO AND DR. DOE ALEXANDER MADE AWARE PATIENT LEFT AMA.

## 2018-01-15 NOTE — NUR
RN NOTES

PATIENT ALERT AND ORIENTED X4, INDEPENDENT WITH AMBULATION AND ADLS. DENIES PAIN OR 
DISCOMFORT, NO SOB NOTED, NEEDS ATTENDED AND MET, CALL LIGHT WITHIN REACH, WILL CONTINUE TO 
MONITOR.

## 2018-01-15 NOTE — NUR
MS/RN NOTES

RECEIVED PATIENT IN BED, RESTING COMFORTABLY , RESPIRATIONS EVEN AND UNLABORED, SKIN WARM TO 
TOUCH, AROUSABLE, FOR CONTINUITY OF CARE ENDORSE.

## 2018-01-31 ENCOUNTER — HOSPITAL ENCOUNTER (OUTPATIENT)
Dept: HOSPITAL 54 - RAD | Age: 58
Discharge: HOME | End: 2018-01-31
Attending: INTERNAL MEDICINE
Payer: MEDICARE

## 2018-01-31 DIAGNOSIS — N18.6: ICD-10-CM

## 2018-01-31 DIAGNOSIS — J90: Primary | ICD-10-CM

## 2018-01-31 DIAGNOSIS — J98.11: ICD-10-CM

## 2018-02-02 ENCOUNTER — HOSPITAL ENCOUNTER (INPATIENT)
Dept: HOSPITAL 54 - ER | Age: 58
LOS: 2 days | Discharge: HOME | DRG: 291 | End: 2018-02-04
Attending: INTERNAL MEDICINE | Admitting: INTERNAL MEDICINE
Payer: MEDICARE

## 2018-02-02 VITALS — WEIGHT: 129 LBS | BODY MASS INDEX: 20.25 KG/M2 | HEIGHT: 67 IN

## 2018-02-02 DIAGNOSIS — I42.9: ICD-10-CM

## 2018-02-02 DIAGNOSIS — I50.32: ICD-10-CM

## 2018-02-02 DIAGNOSIS — Z99.2: ICD-10-CM

## 2018-02-02 DIAGNOSIS — N18.6: ICD-10-CM

## 2018-02-02 DIAGNOSIS — J98.11: ICD-10-CM

## 2018-02-02 DIAGNOSIS — D64.9: ICD-10-CM

## 2018-02-02 DIAGNOSIS — I31.3: ICD-10-CM

## 2018-02-02 DIAGNOSIS — I13.2: Primary | ICD-10-CM

## 2018-02-02 DIAGNOSIS — Z76.82: ICD-10-CM

## 2018-02-02 DIAGNOSIS — E27.8: ICD-10-CM

## 2018-02-02 DIAGNOSIS — R18.8: ICD-10-CM

## 2018-02-02 LAB
BASOPHILS # BLD AUTO: 0.2 /CMM (ref 0–0.2)
BASOPHILS NFR BLD AUTO: 2.3 % (ref 0–2)
BUN SERPL-MCNC: 34 MG/DL (ref 7–18)
CALCIUM SERPL-MCNC: 9.8 MG/DL (ref 8.5–10.1)
CHLORIDE SERPL-SCNC: 97 MMOL/L (ref 98–107)
CO2 SERPL-SCNC: 35 MMOL/L (ref 21–32)
CREAT SERPL-MCNC: 4 MG/DL (ref 0.6–1.3)
EOSINOPHIL # BLD AUTO: 0.3 /CMM (ref 0–0.7)
EOSINOPHIL NFR BLD AUTO: 3.4 % (ref 0–6)
GLUCOSE SERPL-MCNC: 211 MG/DL (ref 74–106)
HCT VFR BLD AUTO: 32 % (ref 39–51)
HGB BLD-MCNC: 10.6 G/DL (ref 13.5–17.5)
LYMPHOCYTES NFR BLD AUTO: 0.9 /CMM (ref 0.8–4.8)
LYMPHOCYTES NFR BLD AUTO: 8.7 % (ref 20–44)
MCH RBC QN AUTO: 28 PG (ref 26–33)
MCHC RBC AUTO-ENTMCNC: 33 G/DL (ref 31–36)
MCV RBC AUTO: 84 FL (ref 80–96)
MONOCYTES NFR BLD AUTO: 1.1 /CMM (ref 0.1–1.3)
MONOCYTES NFR BLD AUTO: 10.7 % (ref 2–12)
NEUTROPHILS # BLD AUTO: 7.4 /CMM (ref 1.8–8.9)
NEUTROPHILS NFR BLD AUTO: 74.9 % (ref 43–81)
PLATELET # BLD AUTO: 487 /CMM (ref 150–450)
POTASSIUM SERPL-SCNC: 4 MMOL/L (ref 3.5–5.1)
RBC # BLD AUTO: 3.78 MIL/UL (ref 4.5–6)
RDW COEFFICIENT OF VARIATION: 14.6 (ref 11.5–15)
SODIUM SERPL-SCNC: 139 MMOL/L (ref 136–145)
WBC NRBC COR # BLD AUTO: 9.9 K/UL (ref 4.3–11)

## 2018-02-02 PROCEDURE — A6403 STERILE GAUZE>16 <= 48 SQ IN: HCPCS

## 2018-02-02 PROCEDURE — Z7610: HCPCS

## 2018-02-02 PROCEDURE — A4606 OXYGEN PROBE USED W OXIMETER: HCPCS

## 2018-02-02 NOTE — NUR
TELE RN ADMISSION NOTES

ADMITTED 58 YO MALE PT ,ALERT, ORIENTED X4 VERBALLY RESPONSIVE.ON ROOM AIR, RESPIRATIONS 
EVEN, UNLABORED,NO APPARENT DISTRESS NOTED.DENIES ANY PAIN OR DISCOMFORT AT THIS TIME. IV 
SITE RT FA INTACT, PATENT. PORFIRIO DIALYSIS FISTULA, DRESSING INTACT.PT HAD DIALYSIS TODAY, 
REMOVED 2.5 L.CALL LIGHT WITHIN REACH.BED LOCKED IN LOWEST POSITION. KEPT CLEAN AND 
COMFORTABLE.ATTENDED ALL NEEDS.WILL CONTINUE TO MONITOR ACCORDINGLY.

## 2018-02-03 VITALS — SYSTOLIC BLOOD PRESSURE: 133 MMHG | DIASTOLIC BLOOD PRESSURE: 75 MMHG

## 2018-02-03 VITALS — DIASTOLIC BLOOD PRESSURE: 87 MMHG | SYSTOLIC BLOOD PRESSURE: 136 MMHG

## 2018-02-03 VITALS — SYSTOLIC BLOOD PRESSURE: 136 MMHG | DIASTOLIC BLOOD PRESSURE: 87 MMHG

## 2018-02-03 VITALS — DIASTOLIC BLOOD PRESSURE: 84 MMHG | SYSTOLIC BLOOD PRESSURE: 139 MMHG

## 2018-02-03 VITALS — DIASTOLIC BLOOD PRESSURE: 77 MMHG | SYSTOLIC BLOOD PRESSURE: 121 MMHG

## 2018-02-03 VITALS — DIASTOLIC BLOOD PRESSURE: 70 MMHG | SYSTOLIC BLOOD PRESSURE: 117 MMHG

## 2018-02-03 LAB
ALBUMIN SERPL BCP-MCNC: 3.2 G/DL (ref 3.4–5)
ALP SERPL-CCNC: 357 U/L (ref 46–116)
ALT SERPL W P-5'-P-CCNC: 16 U/L (ref 12–78)
AST SERPL W P-5'-P-CCNC: 18 U/L (ref 15–37)
BASOPHILS # BLD AUTO: 0 /CMM (ref 0–0.2)
BASOPHILS NFR BLD AUTO: 0.5 % (ref 0–2)
BILIRUB SERPL-MCNC: 0.9 MG/DL (ref 0.2–1)
BUN SERPL-MCNC: 44 MG/DL (ref 7–18)
CALCIUM SERPL-MCNC: 9.5 MG/DL (ref 8.5–10.1)
CHLORIDE SERPL-SCNC: 100 MMOL/L (ref 98–107)
CO2 SERPL-SCNC: 35 MMOL/L (ref 21–32)
CREAT SERPL-MCNC: 5 MG/DL (ref 0.6–1.3)
EOSINOPHIL # BLD AUTO: 0.3 /CMM (ref 0–0.7)
EOSINOPHIL NFR BLD AUTO: 3.2 % (ref 0–6)
GLUCOSE SERPL-MCNC: 200 MG/DL (ref 74–106)
HCT VFR BLD AUTO: 30 % (ref 39–51)
HGB BLD-MCNC: 10.1 G/DL (ref 13.5–17.5)
LYMPHOCYTES NFR BLD AUTO: 0.9 /CMM (ref 0.8–4.8)
LYMPHOCYTES NFR BLD AUTO: 10.7 % (ref 20–44)
MAGNESIUM SERPL-MCNC: 2 MG/DL (ref 1.8–2.4)
MCH RBC QN AUTO: 29 PG (ref 26–33)
MCHC RBC AUTO-ENTMCNC: 34 G/DL (ref 31–36)
MCV RBC AUTO: 87 FL (ref 80–96)
MONOCYTES NFR BLD AUTO: 1.1 /CMM (ref 0.1–1.3)
MONOCYTES NFR BLD AUTO: 13 % (ref 2–12)
NEUTROPHILS # BLD AUTO: 6.3 /CMM (ref 1.8–8.9)
NEUTROPHILS NFR BLD AUTO: 72.6 % (ref 43–81)
PHOSPHATE SERPL-MCNC: 3.2 MG/DL (ref 2.5–4.9)
PLATELET # BLD AUTO: 433 /CMM (ref 150–450)
POTASSIUM SERPL-SCNC: 4.3 MMOL/L (ref 3.5–5.1)
PROT SERPL-MCNC: 9.1 G/DL (ref 6.4–8.2)
RBC # BLD AUTO: 3.48 MIL/UL (ref 4.5–6)
RDW COEFFICIENT OF VARIATION: 15.8 (ref 11.5–15)
SODIUM SERPL-SCNC: 145 MMOL/L (ref 136–145)
WBC NRBC COR # BLD AUTO: 8.7 K/UL (ref 4.3–11)

## 2018-02-03 PROCEDURE — 5A1D70Z PERFORMANCE OF URINARY FILTRATION, INTERMITTENT, LESS THAN 6 HOURS PER DAY: ICD-10-PCS

## 2018-02-03 RX ADMIN — TACROLIMUS SCH MG: 0.5 CAPSULE, GELATIN COATED ORAL at 09:34

## 2018-02-03 RX ADMIN — VALSARTAN SCH MG: 80 TABLET ORAL at 09:00

## 2018-02-03 RX ADMIN — ZOLPIDEM TARTRATE PRN MG: 5 TABLET, FILM COATED ORAL at 22:11

## 2018-02-03 RX ADMIN — VALSARTAN SCH MG: 80 TABLET ORAL at 16:39

## 2018-02-03 RX ADMIN — ZOLPIDEM TARTRATE PRN MG: 5 TABLET, FILM COATED ORAL at 01:42

## 2018-02-03 RX ADMIN — PANTOPRAZOLE SODIUM SCH MG: 40 TABLET, DELAYED RELEASE ORAL at 09:34

## 2018-02-03 RX ADMIN — TACROLIMUS SCH MG: 0.5 CAPSULE, GELATIN COATED ORAL at 21:13

## 2018-02-03 RX ADMIN — THERA TABS SCH UDTAB: TAB at 09:34

## 2018-02-03 NOTE — NUR
RN OPENING NOTES

PATIENT IS IN BED, ALERT AND ORIENTED X3. VS STABLE. NO C/O PAIN OR DISCOMFORT AT THIS TIME. 
NO SOB NOTED. RESPIRATIONS EVEN AND UNLABORED. PATIENT HAS NO IV LINE. HE REQUESTED TO TAKE 
IT OUT.  RIGHT UA DIALYSIS FISTULA. BED IN LOW AND LOCKED POSITION, SIDE RAILSX2. CALL LIGHT 
WITHIN EASY REACH. WILL CONTINUE TO MONITOR AND ASSESS DURING THE SHIFT.

## 2018-02-03 NOTE — NUR
TELE RN NOTE

PT STATED THAT HE NEEDS TO TAKE TACROLIMUS 0.5 MG AND THAT HE DID NOT TAKE THE DOSE AT 2100. 
AND THAT HE HAS HIS OWN MEDICATION AND NEEDS TO TAKE IT RIGHT NOW.PT TOOK HIS OWN 
MEDICATION. CHARGE NURSE NOTIFIED.PER NURSING SUPERVISOR OK TO TAKE TACROLIMUS 0.5 AT THIS 
TIME ,ATTENDED ALL NEEDS.WILL CONTINUE TO MONITOR.

## 2018-02-03 NOTE — NUR
TELE RN CLOSING NOTES

PT IN BED, RESTING COMFORTABLY, ON ROOM AIR, RESPIRATIONS EVEN, UNLABORED, NO SOB NOTED.HOB 
ELEVATED..IV SITE INTACT, PATENT. CALL LIGHT WITHIN REACH.KEPT CLEAN AND 
COMFORTABLE.ATTENDED ALL NEEDS.WILL MONITOR ACCORDINGLY.

## 2018-02-03 NOTE — NUR
MS RN CLOSING NOTES

PATIENT IN BED RESTING COMFORTABLY, A/O X 3, ON ROOM AIR. IN NO APPARENT DISTRESS AT THIS 
TIME. RESPIRATIONS EVEN AND UNLABORED. HOB ELEVATED, PATIENT HAS NO IV LINE IN PLACE. 
PATIENT REQUESTED HEPLOCK  TO BE REMOVED EARLIER IN THE DAY. SAFETY MEASURES IN PLACE, BED 
IN LOW LOCKED POSITION, CALL LIGHT WITHIN EASY REACH, WILL ENDORSE TO THE NIGHT SHIFT FOR 
CONTINUATION OF CARE.

## 2018-02-03 NOTE — NUR
MS RN NOTES

RECEIVED PATIENT IN BED RESTING COMFORTABLY, A/O X 3, ON ROOM AIR. IN NO APPARENT DISTRESS 
AT THIS TIME. RESPIRATIONS EVEN AND UNLABORED. HOB ELEVATED, PATIENT HAS NO IV LINE IN 
PLACE. PATIENT REQUESTED HEPLOCK  TO BE REMOVED EARLIER IN THE DAY. SAFETY MEASURES IN 
PLACE, BED IN LOW LOCKED POSITION, CALL LIGHT WITHIN EASY REACH, WILL CONTINUE TO MONITOR.

## 2018-02-04 VITALS — SYSTOLIC BLOOD PRESSURE: 114 MMHG | DIASTOLIC BLOOD PRESSURE: 73 MMHG

## 2018-02-04 LAB — URATE SERPL-MCNC: 5.1 MG/DL (ref 2.6–7.2)

## 2018-02-04 RX ADMIN — PANTOPRAZOLE SODIUM SCH MG: 40 TABLET, DELAYED RELEASE ORAL at 08:54

## 2018-02-04 RX ADMIN — THERA TABS SCH UDTAB: TAB at 08:55

## 2018-02-04 RX ADMIN — TACROLIMUS SCH MG: 0.5 CAPSULE, GELATIN COATED ORAL at 08:57

## 2018-02-04 RX ADMIN — VALSARTAN SCH MG: 80 TABLET ORAL at 08:56

## 2018-02-04 NOTE — NUR
RN CLOSING NOTES

PATIENT IS IN BED, ALERT AND ORIENTED X3. VS STABLE. NO C/O PAIN OR DISCOMFORT AT THIS TIME. 
NO SOB NOTED. RESPIRATIONS EVEN AND UNLABORED. PATIENT HAS NO IV LINE. HE REQUESTED TO TAKE 
IT OUT. RIGHT UA DIALYSIS FISTULA. ALL NEEDS ARE MET AND MEDICATIONS GIVEN PER MD ORDER. BED 
IN LOW AND LOCKED POSITION, SIDE RAILSX2. CALL LIGHT WITHIN EASY REACH. WILL ENDORSE TO RN 
DAY SHIFT FOR DESIRAE.

## 2018-02-04 NOTE — NUR
MS RN OPENING NOTES

RECEIVED PATIENT IN BED RESTING COMFORTABLY, A/O X 3, ON ROOM AIR. IN NO APPARENT DISTRESS 
AT THIS TIME. RESPIRATIONS EVEN AND UNLABORED. HOB ELEVATED, PATIENT HAS NO IV LINE IN PLACE 
PER HIS REQUEST, MD NOTIFIED.  SAFETY MEASURES IN PLACE, BED IN LOW LOCKED POSITION, CALL 
LIGHT WITHIN EASY REACH, WILL CONTINUE TO MONITOR.

## 2018-02-04 NOTE — NUR
ms rn discharge notes



discharge instructions given to patient and able to understand instructions. Signed 
discharge paper and belonging list. no missing items noted.  Patient is alert and oriented x 
4, verbally responsive and able to make needs known. No complaint of pain or discomfort at 
this time, on room air.  Health teaching and education rendered.  Skin is intact.  Flu and 
pneumonia vaccine not given due to patient already received.  Patient left in stable 
condition accompanied by RN assigned.  Vital signs checked and recorded. MD and charge nurse 
aware.

## 2018-12-20 ENCOUNTER — HOSPITAL ENCOUNTER (OUTPATIENT)
Dept: HOSPITAL 54 - RAD | Age: 58
Discharge: HOME | End: 2018-12-20
Attending: INTERNAL MEDICINE
Payer: MEDICARE

## 2018-12-20 DIAGNOSIS — I70.0: Primary | ICD-10-CM

## 2019-12-10 ENCOUNTER — HOSPITAL ENCOUNTER (OUTPATIENT)
Dept: HOSPITAL 54 - RAD | Age: 59
Discharge: HOME | End: 2019-12-10
Attending: INTERNAL MEDICINE
Payer: MEDICARE

## 2019-12-10 DIAGNOSIS — Z01.818: Primary | ICD-10-CM

## 2020-12-01 ENCOUNTER — HOSPITAL ENCOUNTER (OUTPATIENT)
Dept: HOSPITAL 54 - MSC | Age: 60
Discharge: HOME | End: 2020-12-01
Attending: INTERNAL MEDICINE
Payer: MEDICARE

## 2020-12-01 DIAGNOSIS — Z94.4: ICD-10-CM

## 2020-12-01 DIAGNOSIS — S30.0XXA: ICD-10-CM

## 2020-12-01 DIAGNOSIS — S22.49XA: ICD-10-CM

## 2020-12-01 DIAGNOSIS — Z94.0: ICD-10-CM

## 2020-12-01 DIAGNOSIS — R60.0: ICD-10-CM

## 2020-12-01 DIAGNOSIS — Z79.52: ICD-10-CM

## 2020-12-01 DIAGNOSIS — R06.02: Primary | ICD-10-CM

## 2020-12-01 DIAGNOSIS — Z79.84: ICD-10-CM

## 2020-12-01 DIAGNOSIS — B18.1: ICD-10-CM

## 2020-12-01 DIAGNOSIS — Z91.81: ICD-10-CM

## 2020-12-01 LAB
ALBUMIN SERPL BCP-MCNC: 4.1 G/DL (ref 3.4–5)
ALP SERPL-CCNC: 157 U/L (ref 46–116)
ALT SERPL W P-5'-P-CCNC: 55 U/L (ref 12–78)
AST SERPL W P-5'-P-CCNC: 47 U/L (ref 15–37)
BASOPHILS # BLD AUTO: 0.1 /CMM (ref 0–0.2)
BASOPHILS NFR BLD AUTO: 0.8 % (ref 0–2)
BILIRUB SERPL-MCNC: 0.5 MG/DL (ref 0.2–1)
BILIRUB UR QL STRIP: NEGATIVE
BUN SERPL-MCNC: 16 MG/DL (ref 7–18)
CALCIUM SERPL-MCNC: 9.4 MG/DL (ref 8.5–10.1)
CHLORIDE SERPL-SCNC: 101 MMOL/L (ref 98–107)
CO2 SERPL-SCNC: 28 MMOL/L (ref 21–32)
COLOR UR: YELLOW
CREAT SERPL-MCNC: 1 MG/DL (ref 0.6–1.3)
CRP SERPL-MCNC: 1.8 MG/DL (ref 0–0.9)
EOSINOPHIL NFR BLD AUTO: 1.9 % (ref 0–6)
GLUCOSE SERPL-MCNC: 153 MG/DL (ref 74–106)
GLUCOSE UR STRIP-MCNC: NEGATIVE MG/DL
HCT VFR BLD AUTO: 50 % (ref 39–51)
HGB BLD-MCNC: 16.1 G/DL (ref 13.5–17.5)
HGB UR QL STRIP: NEGATIVE ERY/UL
LEUKOCYTE ESTERASE UR QL STRIP: NEGATIVE
LYMPHOCYTES NFR BLD AUTO: 1 /CMM (ref 0.8–4.8)
LYMPHOCYTES NFR BLD AUTO: 11.8 % (ref 20–44)
MAGNESIUM SERPL-MCNC: 2.1 MG/DL (ref 1.8–2.4)
MCHC RBC AUTO-ENTMCNC: 32 G/DL (ref 31–36)
MCV RBC AUTO: 89 FL (ref 80–96)
MONOCYTES NFR BLD AUTO: 1 /CMM (ref 0.1–1.3)
MONOCYTES NFR BLD AUTO: 11.6 % (ref 2–12)
NEUTROPHILS # BLD AUTO: 6.1 /CMM (ref 1.8–8.9)
NEUTROPHILS NFR BLD AUTO: 73.9 % (ref 43–81)
NITRITE UR QL STRIP: NEGATIVE
PH UR STRIP: 6 [PH] (ref 5–8)
PHOSPHATE SERPL-MCNC: 2.3 MG/DL (ref 2.5–4.9)
PLATELET # BLD AUTO: 284 /CMM (ref 150–450)
POTASSIUM SERPL-SCNC: 4.3 MMOL/L (ref 3.5–5.1)
PROT SERPL-MCNC: 7.6 G/DL (ref 6.4–8.2)
PROT UR QL STRIP: NEGATIVE MG/DL
PROT UR-MCNC: 28.7 MG/DL (ref 0–11.9)
RBC # BLD AUTO: 5.6 MIL/UL (ref 4.5–6)
RBC #/AREA URNS HPF: (no result) /HPF (ref 0–2)
SODIUM SERPL-SCNC: 141 MMOL/L (ref 136–145)
UROBILINOGEN UR STRIP-MCNC: 0.2 EU/DL
WBC #/AREA URNS HPF: (no result) /HPF (ref 0–3)
WBC NRBC COR # BLD AUTO: 8.3 K/UL (ref 4.3–11)

## 2020-12-01 PROCEDURE — 82607 VITAMIN B-12: CPT

## 2020-12-01 PROCEDURE — 82570 ASSAY OF URINE CREATININE: CPT

## 2020-12-01 PROCEDURE — 82043 UR ALBUMIN QUANTITATIVE: CPT

## 2020-12-01 PROCEDURE — 81001 URINALYSIS AUTO W/SCOPE: CPT

## 2020-12-01 PROCEDURE — 84155 ASSAY OF PROTEIN SERUM: CPT

## 2020-12-01 PROCEDURE — 83735 ASSAY OF MAGNESIUM: CPT

## 2020-12-01 PROCEDURE — 85652 RBC SED RATE AUTOMATED: CPT

## 2020-12-01 PROCEDURE — 80053 COMPREHEN METABOLIC PANEL: CPT

## 2020-12-01 PROCEDURE — 86140 C-REACTIVE PROTEIN: CPT

## 2020-12-01 PROCEDURE — 84100 ASSAY OF PHOSPHORUS: CPT

## 2020-12-01 PROCEDURE — 71046 X-RAY EXAM CHEST 2 VIEWS: CPT

## 2020-12-01 PROCEDURE — 82746 ASSAY OF FOLIC ACID SERUM: CPT

## 2020-12-01 PROCEDURE — 36415 COLL VENOUS BLD VENIPUNCTURE: CPT

## 2020-12-01 PROCEDURE — 83036 HEMOGLOBIN GLYCOSYLATED A1C: CPT

## 2020-12-01 PROCEDURE — 85025 COMPLETE CBC W/AUTO DIFF WBC: CPT

## 2020-12-02 LAB — FOLATE SERPL-MCNC: 14.1 NG/ML (ref 3–?)
